# Patient Record
Sex: FEMALE | Race: BLACK OR AFRICAN AMERICAN | NOT HISPANIC OR LATINO | URBAN - METROPOLITAN AREA
[De-identification: names, ages, dates, MRNs, and addresses within clinical notes are randomized per-mention and may not be internally consistent; named-entity substitution may affect disease eponyms.]

---

## 2022-12-19 ENCOUNTER — OUTPATIENT (OUTPATIENT)
Dept: OUTPATIENT SERVICES | Facility: HOSPITAL | Age: 47
LOS: 1 days | End: 2022-12-19
Payer: COMMERCIAL

## 2022-12-19 DIAGNOSIS — Z01.818 ENCOUNTER FOR OTHER PREPROCEDURAL EXAMINATION: ICD-10-CM

## 2022-12-19 PROCEDURE — 86850 RBC ANTIBODY SCREEN: CPT

## 2022-12-19 PROCEDURE — 86900 BLOOD TYPING SEROLOGIC ABO: CPT

## 2022-12-19 PROCEDURE — 86901 BLOOD TYPING SEROLOGIC RH(D): CPT

## 2023-02-08 NOTE — ASU PATIENT PROFILE, ADULT - HEALTHCARE INFORMATION NEEDED, PROFILE
post operative instructions , medication to pharmacy. and post surgical appointment fallow up with MD

## 2023-02-08 NOTE — ASU PATIENT PROFILE, ADULT - BLOOD TRANSFUSION, PREVIOUS, PROFILE
Called and notified patient of results and recommendations per Gabriel Dubose NP. Patient verbalized understanding and appreciation. no

## 2023-02-08 NOTE — ASU PATIENT PROFILE, ADULT - NS PREOP UNDERSTANDS INFO
Spoke to patient to b NPO/No solid foods after 12MN.  Allow to drink water till  6-7 am , dress comfortable.  leave all valuable things at home, Bring ID photo, insurance and vaccination cards. escort arranged. address and telephone given to patient/yes

## 2023-02-08 NOTE — ASU PATIENT PROFILE, ADULT - FALL HARM RISK - UNIVERSAL INTERVENTIONS
Bed in lowest position, wheels locked, appropriate side rails in place/Call bell, personal items and telephone in reach/Instruct patient to call for assistance before getting out of bed or chair/Non-slip footwear when patient is out of bed/[place] to call system/Physically safe environment - no spills, clutter or unnecessary equipment/Purposeful Proactive Rounding/Room/bathroom lighting operational, light cord in reach

## 2023-02-09 ENCOUNTER — OUTPATIENT (OUTPATIENT)
Dept: OUTPATIENT SERVICES | Facility: HOSPITAL | Age: 48
LOS: 1 days | Discharge: HOME | End: 2023-02-09
Payer: COMMERCIAL

## 2023-02-09 VITALS
SYSTOLIC BLOOD PRESSURE: 130 MMHG | HEIGHT: 68 IN | OXYGEN SATURATION: 97 % | TEMPERATURE: 98 F | RESPIRATION RATE: 16 BRPM | WEIGHT: 186.07 LBS | HEART RATE: 65 BPM | DIASTOLIC BLOOD PRESSURE: 62 MMHG

## 2023-02-09 VITALS
DIASTOLIC BLOOD PRESSURE: 56 MMHG | RESPIRATION RATE: 17 BRPM | OXYGEN SATURATION: 98 % | HEART RATE: 59 BPM | SYSTOLIC BLOOD PRESSURE: 133 MMHG | TEMPERATURE: 98 F

## 2023-02-09 PROCEDURE — 88305 TISSUE EXAM BY PATHOLOGIST: CPT | Mod: 26

## 2023-02-09 DEVICE — MYOSURE TISSUE REMOVAL DEVICE REACH: Type: IMPLANTABLE DEVICE | Status: FUNCTIONAL

## 2023-02-09 RX ORDER — ACETAMINOPHEN 500 MG
1000 TABLET ORAL ONCE
Refills: 0 | Status: COMPLETED | OUTPATIENT
Start: 2023-02-09 | End: 2023-02-09

## 2023-02-09 RX ORDER — HYDROCHLOROTHIAZIDE 25 MG
1 TABLET ORAL
Qty: 0 | Refills: 0 | DISCHARGE

## 2023-02-09 RX ADMIN — Medication 1000 MILLIGRAM(S): at 14:22

## 2023-02-09 NOTE — ASU DISCHARGE PLAN (ADULT/PEDIATRIC) - C. MAKE IMPORTANT PERSONAL OR BUSINESS DECISIONS
"[facility]  [address] [address].  or  [address]  [phone] phone     [phone] fax  Hours of Operation: Monday- Friday 8:00am- 5:00pm  After hours phone  [phone]  Hematology / Oncology Physicians on call      Dr. Donn Martin      Please call with any concerns regarding your appointment today.    Support Groups/Classes    Support groups and classes are being offered at the   [facility] and [facility]!!    "Cooking with Cancer" (Nutrition Class):  Second Wednesday of each month   at noon at the [facility].  Metastatic Support Group:  Third Tuesday of each month   at noon at the [facility].  Next Steps Class/Group: Second and fourth Thursday of each month at noon at the [facility].  [facility] (Breast Cancer Support Group): First Tuesday of each month   at 5:30pm at the [facility] location.  [employer]: [facility]: Second and third Tuesday of each month from 7:30am - 2pm.  [facility]: First and fourth Tuesday of each month from 7:30am - 2pm    If you are interested in attending or would like more information please ask our social workers or your nurse!  "
Statement Selected

## 2023-02-09 NOTE — ASU DISCHARGE PLAN (ADULT/PEDIATRIC) - CARE PROVIDER_API CALL
Sherrill Baxter (MD)  Female Pelvic MedReconst Surg; Obstetrics and Gynecology  [address] [place], [place] [address]  Phone: [phone]  Fax: [phone]  Follow Up Time:

## 2023-02-13 LAB — SURGICAL PATHOLOGY STUDY: SIGNIFICANT CHANGE UP

## 2023-03-03 VITALS
HEIGHT: 68 IN | SYSTOLIC BLOOD PRESSURE: 132 MMHG | OXYGEN SATURATION: 99 % | DIASTOLIC BLOOD PRESSURE: 85 MMHG | HEART RATE: 58 BPM | RESPIRATION RATE: 16 BRPM | TEMPERATURE: 97 F | WEIGHT: 194.23 LBS

## 2023-03-03 NOTE — ASU PATIENT PROFILE, ADULT - FALL HARM RISK - UNIVERSAL INTERVENTIONS
Bed in lowest position, wheels locked, appropriate side rails in place/Call bell, personal items and telephone in reach/Instruct patient to call for assistance before getting out of bed or chair/Non-slip footwear when patient is out of bed/[place] to call system/Physically safe environment - no spills, clutter or unnecessary equipment/Purposeful Proactive Rounding/Room/bathroom lighting operational, light cord in reach

## 2023-03-03 NOTE — PRE-OP CHECKLIST - SKIN PREP
Discharge Instructions:  Diet as tolerated  Resume usual home medications unless otherwise instructed  Activity as tolerated  May shower or bathe in 24 hours, pat incisions dry  No swimming, hot tub, or bath tub use for 2 weeks.  Change saulo pad as needed  Pelvic rest (No tampons, douching, or sex) for 2 weeks  No driving for 1 week and not while taking pain medications  Don’t lift anything heavier than 10 pounds for 2 weeks    Patient/Family given For your Well Being Teaching Sheet:  Care After Anesthesia/ Sedation  Pain Management Tips  About Surgical Site infection    FOLLOW-UP CARE  Make an appointment to see your doctor     Call your doctor if you have:   Temperature above 101 (F) or chills  Pain not relieved by pain medication  Vaginal bleeding heavier than one pad per hour or foul vaginal discharge  Severe abdominal pain/ bloating  Marked redness, drainage, or excessive bleeding from incision  Neck or shoulder pain not responding to pain medication  Leg pain or swelling  Chest pain or shortness of breath  Difficulty urinating  Fainting     done

## 2023-03-06 ENCOUNTER — OUTPATIENT (OUTPATIENT)
Dept: OUTPATIENT SERVICES | Facility: HOSPITAL | Age: 48
LOS: 1 days | Discharge: HOME | End: 2023-03-06
Payer: COMMERCIAL

## 2023-03-06 VITALS
SYSTOLIC BLOOD PRESSURE: 140 MMHG | DIASTOLIC BLOOD PRESSURE: 75 MMHG | OXYGEN SATURATION: 100 % | HEART RATE: 62 BPM | TEMPERATURE: 97 F | RESPIRATION RATE: 13 BRPM

## 2023-03-06 LAB — GLUCOSE BLDC GLUCOMTR-MCNC: 93 MG/DL — SIGNIFICANT CHANGE UP (ref 70–99)

## 2023-03-06 PROCEDURE — 88307 TISSUE EXAM BY PATHOLOGIST: CPT

## 2023-03-06 PROCEDURE — 88305 TISSUE EXAM BY PATHOLOGIST: CPT | Mod: 26

## 2023-03-06 PROCEDURE — C1889: CPT

## 2023-03-06 PROCEDURE — 88302 TISSUE EXAM BY PATHOLOGIST: CPT | Mod: 26

## 2023-03-06 PROCEDURE — 86850 RBC ANTIBODY SCREEN: CPT

## 2023-03-06 PROCEDURE — 86900 BLOOD TYPING SEROLOGIC ABO: CPT

## 2023-03-06 PROCEDURE — 88307 TISSUE EXAM BY PATHOLOGIST: CPT | Mod: 26

## 2023-03-06 PROCEDURE — 58573 TLH W/T/O UTERUS OVER 250 G: CPT

## 2023-03-06 PROCEDURE — 88305 TISSUE EXAM BY PATHOLOGIST: CPT

## 2023-03-06 PROCEDURE — 58662 LAPAROSCOPY EXCISE LESIONS: CPT

## 2023-03-06 PROCEDURE — 82962 GLUCOSE BLOOD TEST: CPT

## 2023-03-06 PROCEDURE — 86901 BLOOD TYPING SEROLOGIC RH(D): CPT

## 2023-03-06 PROCEDURE — 88302 TISSUE EXAM BY PATHOLOGIST: CPT

## 2023-03-06 DEVICE — SURGIFLO HEMOSTATIC MATRIX KIT
Type: IMPLANTABLE DEVICE | Status: NON-FUNCTIONAL
Removed: 2023-03-06

## 2023-03-06 DEVICE — SURGIFLO MATRIX WITH THROMBIN KIT
Type: IMPLANTABLE DEVICE | Status: NON-FUNCTIONAL
Removed: 2023-03-06

## 2023-03-06 DEVICE — CLIP APPLIER ETHICON LIGAMAX 5MM
Type: IMPLANTABLE DEVICE | Status: NON-FUNCTIONAL
Removed: 2023-03-06

## 2023-03-06 RX ORDER — IBUPROFEN 200 MG
1 TABLET ORAL
Qty: 0 | Refills: 0 | DISCHARGE

## 2023-03-06 RX ORDER — HYDROCHLOROTHIAZIDE 25 MG
1 TABLET ORAL
Qty: 0 | Refills: 0 | DISCHARGE

## 2023-03-06 RX ORDER — TRAMADOL HYDROCHLORIDE 50 MG/1
50 TABLET ORAL ONCE
Refills: 0 | Status: DISCONTINUED | OUTPATIENT
Start: 2023-03-06 | End: 2023-03-06

## 2023-03-06 RX ORDER — PHENAZOPYRIDINE HCL 100 MG
200 TABLET ORAL ONCE
Refills: 0 | Status: COMPLETED | OUTPATIENT
Start: 2023-03-06 | End: 2023-03-06

## 2023-03-06 RX ORDER — HEPARIN SODIUM 5000 [USP'U]/ML
5000 INJECTION INTRAVENOUS; SUBCUTANEOUS ONCE
Refills: 0 | Status: COMPLETED | OUTPATIENT
Start: 2023-03-06 | End: 2023-03-06

## 2023-03-06 RX ORDER — SODIUM CHLORIDE 9 MG/ML
1000 INJECTION, SOLUTION INTRAVENOUS
Refills: 0 | Status: DISCONTINUED | OUTPATIENT
Start: 2023-03-06 | End: 2023-03-06

## 2023-03-06 RX ORDER — ONDANSETRON 8 MG/1
8 TABLET, FILM COATED ORAL ONCE
Refills: 0 | Status: DISCONTINUED | OUTPATIENT
Start: 2023-03-06 | End: 2023-03-06

## 2023-03-06 RX ORDER — SIMETHICONE 80 MG/1
80 TABLET, CHEWABLE ORAL ONCE
Refills: 0 | Status: DISCONTINUED | OUTPATIENT
Start: 2023-03-06 | End: 2023-03-06

## 2023-03-06 RX ADMIN — Medication 200 MILLIGRAM(S): at 09:18

## 2023-03-06 RX ADMIN — TRAMADOL HYDROCHLORIDE 50 MILLIGRAM(S): 50 TABLET ORAL at 20:40

## 2023-03-06 RX ADMIN — HEPARIN SODIUM 5000 UNIT(S): 5000 INJECTION INTRAVENOUS; SUBCUTANEOUS at 09:17

## 2023-03-06 NOTE — ASU DISCHARGE PLAN (ADULT/PEDIATRIC) - CARE PROVIDER_API CALL
Sherrill Baxter (MD)  Female Pelvic MedReconst Surg; Obstetrics and Gynecology  [address] [place], [place] [address]  Phone: [phone]  Fax: [phone]  Follow Up Time: 2 weeks

## 2023-03-06 NOTE — CHART NOTE - NSCHARTNOTEFT_GEN_A_CORE
Pt seen and examined at bedside. Pt complains of mild abdominal pain.   Pt denies any fever, chills, chest pain, SOB, nausea or vomiting     T(F): 97.2 (03-06-23 @ 20:15), Max: 97.2 (03-06-23 @ 18:51)  HR: 62 (03-06-23 @ 20:15) (50 - 75)  BP: 140/75 (03-06-23 @ 20:15) (132/75 - 145/89)  RR: 13 (03-06-23 @ 20:15) (10 - 18)  SpO2: 100% (03-06-23 @ 20:15) (97% - 100%)  Wt(kg): --    03-06 @ 07:01  -  03-06 @ 20:56  --------------------------------------------------------  IN: 300 mL / OUT: 400 mL / NET: -100 mL        lactated ringers. 1000 milliLiter(s) IV Continuous <Continuous>  ondansetron Injectable 8 milliGRAM(s) IV Push Once PRN Nausea and/or Vomiting  simethicone 80 milliGRAM(s) Chew Once PRN Gas      Physical exam:  Constitutional: NAD  Abdomen: incision site clean, dry and intact. Soft, mildly tender, nondistended  Extremities: no lower extremity edema, or calve tenderness. SCDs in place    A:   47y, s/p TLH BS for fibroid uterus. Meeting postop milestones appropriately.     Plan:  1. Vital signs stable, continue to monitor per protocol  2. Pain control Tylenol/ tramadol PRN for BTP.  3. DVT prophylaxis: SCDs, Lovenox at   4. CV: 125cc/hr IVH   5. Pulm: Incentive spirometer (at least 10 times per hour while awake)   6. GI:  reg diet   7. : voiding

## 2023-03-06 NOTE — BRIEF OPERATIVE NOTE - OPERATION/FINDINGS
See dictation. 28 week size fibroid uterus, mobile. Endometriosis lesions noted, left adenxa adherent to left side wall. TLH, BS performed. Vaginal cuff closed with vlock suture. Specimen placed in bag and morcellated via supraumbilical port. Hemostasis excellent. All skin ports closed with monocryl and dermabond

## 2023-03-06 NOTE — BRIEF OPERATIVE NOTE - NSICDXBRIEFPROCEDURE_GEN_ALL_CORE_FT
PROCEDURES:  Laparoscopic hysterectomy, total, with salpingectomy, uterus 250 g or less 06-Mar-2023 11:49:48  Lizabeth Garza

## 2023-03-06 NOTE — PACU DISCHARGE NOTE - NS MD DISCHARGE NOTE DISCHARGE
PICC Insertion Procedure Note    Consents confirmed, vessel patency confirmed with ultrasound, real time ultrasound image printed and placed in patient chart. Risks and benefits of procedure explained to patient/family and education regarding central line associated bloodstream infections provided. Questions answered.     PICC placed in RUE per MD order with ultrasound guidance. 4 Fr, single lumen PICC placed in basilic vein after 1 attempt(s). 2.0 cc's of 1% lidocaine injected intradermally, 21 gauge microintroducer needle and modified Seldinger technique used. 48 cm total catheter length, 0 cm external measurement inserted with good blood return. Each lumen flushed without resistance with 10 mL 0.9% normal saline. PICC line secured with Biopatch and Tegaderm.    PICC tip location in the SVC confirmed by ECG technology. Pt tolerated procedure well.  Patient condition relayed to unit RN or ordering physician via this post procedure note in the EMR.     Sho[employer] Power PICC ref # [id number], Lot # [id number]   Home

## 2023-03-20 LAB — SURGICAL PATHOLOGY STUDY: SIGNIFICANT CHANGE UP

## (undated) DEVICE — STAPLER SKIN PROXIMATE

## (undated) DEVICE — RUMI KOH-EFFICIENT STAINLESS STEEL 3.0CM

## (undated) DEVICE — WARMING BLANKET UPPER ADULT

## (undated) DEVICE — TIP METZENBAUM SCISSOR MONOPOLAR ENDOCUT (ORANGE)

## (undated) DEVICE — TROCAR APPLIED MEDICAL KII FIOS FIRST ENTRY 12MM X 100MM ADVANCED FIXATION

## (undated) DEVICE — DRAPE MAYO STAND 30"

## (undated) DEVICE — INSUFFLATION NDL COVIDIEN SURGINEEDLE VERESS 120MM

## (undated) DEVICE — DRSG PAD SANITARY OB

## (undated) DEVICE — D HELP - CLEARVIEW CLEARIFY SYSTEM

## (undated) DEVICE — POSITIONER FOAM EGG CRATE ULNAR 2PCS (PINK)

## (undated) DEVICE — SUT MONOCRYL 4-0 27" PS-2 UNDYED

## (undated) DEVICE — Device

## (undated) DEVICE — RUMI TIP GREEN 6.7MM X 10CM

## (undated) DEVICE — APPLICATOR ENDOSCOPIC FOR SUGIFLO

## (undated) DEVICE — FLUENT FMS PROCEDURE KIT

## (undated) DEVICE — GLV 6.5 PROTEXIS (WHITE)

## (undated) DEVICE — SHEARS HARMONIC HD 1000I 5MM X 36CM CURVED TIP

## (undated) DEVICE — MYOSURE SOCK

## (undated) DEVICE — PACK GYN WDC

## (undated) DEVICE — VENODYNE/SCD SLEEVE CALF MEDIUM

## (undated) DEVICE — POSITIONER PINK PAD PIGAZZI SYSTEM XL W ARM PROTECTOR

## (undated) DEVICE — PREP CHLOROHEXIDINE 4% 118CC KIT

## (undated) DEVICE — SUT VICRYL 0 27" UR-6

## (undated) DEVICE — SLV COMPRESSION KNEE MED

## (undated) DEVICE — NDL GRASPING DISP

## (undated) DEVICE — FOLEY TRAY 16FR 5CC LF UMETER CLOSED

## (undated) DEVICE — LIGASURE BLUNT TIP 37CM

## (undated) DEVICE — MYOSURE SCOPE SEAL

## (undated) DEVICE — SPONGE ENDO PEANUT 5MM

## (undated) DEVICE — BLADE SURGICAL #10 CARBON

## (undated) DEVICE — TROCAR APPLIED MEDICAL KII FIOS FIRST ENTRY 5MM X 100MM ADVANCED FIXATION

## (undated) DEVICE — NDL HYPO SAFE 22G X 1.5" (BLACK)

## (undated) DEVICE — SOL IRR BAG NS 0.9% 3000ML

## (undated) DEVICE — TUBING STRYKER PNEUMOCLEAR SMOKE EVACUATION HIGH FLOW

## (undated) DEVICE — PACK D&C

## (undated) DEVICE — DRSG DERMABOND 0.7ML

## (undated) DEVICE — DRAPE IOBAN 23" X 23"